# Patient Record
Sex: FEMALE | Race: WHITE | NOT HISPANIC OR LATINO | Employment: OTHER | URBAN - METROPOLITAN AREA
[De-identification: names, ages, dates, MRNs, and addresses within clinical notes are randomized per-mention and may not be internally consistent; named-entity substitution may affect disease eponyms.]

---

## 2024-05-08 ENCOUNTER — TRANSCRIBE ORDERS (OUTPATIENT)
Dept: PHYSICAL THERAPY | Facility: HOSPITAL | Age: 79
End: 2024-05-08
Payer: MEDICARE

## 2024-05-08 DIAGNOSIS — I89.0 LYMPHEDEMA: Primary | ICD-10-CM

## 2024-05-16 ENCOUNTER — HOSPITAL ENCOUNTER (OUTPATIENT)
Dept: OCCUPATIONAL THERAPY | Facility: HOSPITAL | Age: 79
Setting detail: THERAPIES SERIES
Discharge: HOME | End: 2024-05-16
Payer: MEDICARE

## 2024-05-16 DIAGNOSIS — I89.0 LYMPHEDEMA OF BOTH LOWER EXTREMITIES: Primary | ICD-10-CM

## 2024-05-16 PROCEDURE — 97166 OT EVAL MOD COMPLEX 45 MIN: CPT

## 2024-05-16 PROCEDURE — 97535 SELF CARE MNGMENT TRAINING: CPT

## 2024-05-16 NOTE — THERAPY EVALUATION
Outpatient Occupational Therapy Lymphedema Initial Evaluation  [facility]     Patient Name: Constantine Puentes  : 1945  MRN: [id number]  Today's Date: 2024      Visit Date: 2024    There is no problem list on file for this patient.       Past Medical History:   Diagnosis Date    CKD (chronic kidney disease)         History reviewed. No pertinent surgical history.      Visit Dx:     ICD-10-CM ICD-9-CM   1. Lymphedema of both lower extremities  I89.0 457.1        Patient History       Row Name 24 1200             History    Chief Complaint Swelling  -RE      Date Current Problem(s) Began 24  Referral date  -RE      Brief Description of Current Complaint Patient presents for evaluation of bilateral lower extremity swelling.  Patient reports that her swelling has been ongoing for at least 5 years.  She has difficulty with compression stockings as they tend to cause skin irritation and itching.  She was referred today because she has a long trip planned where she will be driving and flying extensively.  Patient is very concerned that her lymphedema will significantly increase with travel.  -RE      Patient/Caregiver Goals Know what to do to help the symptoms;Decrease swelling  -RE      How has patient tried to help current problem? elevation  -RE      Are you or can you be pregnant No  -RE         Pain     Pain at Present 0  -RE         Fall Risk Assessment    Number of falls reported in the last 12 months 0  -RE         Services    Are you currently receiving Home Health services No  -RE         Daily Activities    Primary Language English  -RE      Are you able to read Yes  -RE      Are you able to write Yes  -RE      How does patient learn best? Listening;Reading;Demonstration  -RE      Teaching needs identified Home Exercise Program;Management of Condition  -RE      Patient is concerned about/has problems with Other (comment)  Skin irritation and increased swelling during travel  -RE   "    Does patient have problems with the following? None  -RE      Barriers to learning None  -RE      Explanation of Functional Status Problem Patient is independent with ADLs  -RE      Pt Participated in POC and Goals Yes  -RE         Safety    Are you being hurt, hit, or frightened by anyone at home or in your life? No  -RE      Are you being neglected by a caregiver No  -RE                User Key  (r) = Recorded By, (t) = Taken By, (c) = Cosigned By      Initials Name Provider Type    RE Ghada Alicea OTR Occupational Therapist                     Lymphedema       Row Name 05/16/24 1300             Subjective Pain    Able to rate subjective pain? yes  -RE      Pre-Treatment Pain Level 0  -RE      Post-Treatment Pain Level 0  -RE      Subjective Pain Comment Patient has increased pain and discomfort at the end of the day  -RE         Subjective    Subjective Comments Patient reports that she is leaving town for an extended vacation on the 22nd of this month.  She is wondering if there is anything that she can do to try to control her lymphedema until she can start treatment.  -RE         Lymphedema Assessment    Lymphedema Classification RLE:;LLE:;secondary;stage 2 (Spontaneously Irreversible)  -RE         LLIS - Physical Concerns    The amount of pain associated with my lymphedema is: 2  -RE      The amount of limb heaviness associated with my lymphedema is: 2  -RE      The amount of skin tightness associated with my lymphedema is: 3  -RE      The size of my swollen limb(s) seems: 2  -RE      Lymphedema affects the movement of my swollen limb(s): 2  -RE      The strength in my swollen limb(s) is: 2  -RE         LLIS - Psychosocial Concerns    Lymphedema affects my body image (i.e., \"how I think I look\"). 3  -RE      Lymphedema affects my socializing with others. 2  -RE      Lymphedema affects my intimate relations with spouse or partner (rate 0 if not applicable 0  -RE      Lymphedema \"gets me down\" (i.e., " depression, frustration, or anger) 2  -RE      I must rely on others for help due to my lymphedema. 0  -RE      I know what to do to manage my lymphedema 1  -RE         LLIS - Functional Concerns    Lymphedema affects my ability to perform self-care activities (i.e. eating, dressing, hygiene) 0  -RE      Lymphedema affects my ability to perform routine home or work-related activities. 0  -RE      Lymphedema affects my performance of preferred leisure activities. 1  -RE      Lymphedema affects proper fit of clothing/shoes 2  -RE      Lymphedema affects my sleep 1  -RE         General ROM    GENERAL ROM COMMENTS Lower extremity active range of motion is within functional limits  -RE         MMT (Manual Muscle Testing)    General MMT Comments Bilateral lower extremity strength is greater than or equal to 4/5  -RE         Lymphedema Edema Assessment    Ptting Edema Category By grade out of 4  -RE      Pitting Edema + 3/4;Moderate  -RE      Stemmer Sign bilateral:;positive  -RE      Juana Diaz Hump bilateral:;positive  -RE         Lymphedema Sensation    Lymphedema Sensation Reports RLE:;LLE:  -RE      Lymphedema Sensation Tests light touch  -RE      Lymphedema Light Touch RLE:;LLE:;WNL  -RE         Lymphedema Measurements    Measurement Type(s) Circumferential  -RE      Circumferential Areas Lower extremities  -RE         BLE Circumferential (cm)    Measurement Location 1 Knee  -RE      Left 1 38.5 cm  -RE      Right 1 37.5 cm  -RE      Measurement Location 2 10 cm below-knee  -RE      Left 2 41 cm  -RE      Right 2 40.5 cm  -RE      Measurement Location 3 20 cm below-knee  -RE      Left 3 39 cm  -RE      Right 3 36.8 cm  -RE      Measurement Location 4 30 cm below-knee  -RE      Left 4 31 cm  -RE      Right 4 30.5 cm  -RE      Measurement Location 5 Ankle  -RE      Left 5 29.4 cm  -RE      Right 5 29.3 cm  -RE      Measurement Location 6 Midfoot  -RE      Left 6 24.5 cm  -RE      Right 6 24.6 cm  -RE      LLE  "Circumferential Total 203.4 cm  -RE      RLE Circumferential Total 199.2 cm  -RE         Compression/Skin Care    Compression/Skin Care Comments Measured for Ledazo dynamic cotton mens sock model 3521 size 4 in 20 to 30 mmHg.  -RE         Lymphedema Life Impact Scale Totals    A.  Total Q1 - Q17 (Do not include Q18) 25  -RE      B.  Total number of questions answered (Q1-Q17) 17  -RE      C. Divide A by B 1.47  -RE      D. Multiple C by 25 36.75  -RE                User Key  (r) = Recorded By, (t) = Taken By, (c) = Cosigned By      Initials Name Provider Type    RE Ghada Alicea, OTR Occupational Therapist                            Therapy Education  Education Details: Discussed lymphedema treatment including estimated duration. Gave patient \"Healthy Habits for Lymphedema Patients\" and \"What is Lymphedema\" and reviewed with patient. Discussed disease process and what to expect from therapy.  Given: Edema management, Symptoms/condition management, Bandaging/dressing change  Program: New  How Provided: Verbal, Written  Provided to: Patient  Level of Understanding: Teach back education performed, Verbalized  62296 - OT Self Care/Mgmt Minutes: 30         OT Goals       Row Name 05/16/24 1800          OT Short Term Goals    STG Date to Achieve 06/16/24  -RE     STG 1 Patient will demonstrate proper awareness of “What is Lymphedema?” and \"Healthy Habits\" for improved prevention, management, care of symptoms and ease of transition to self-care of condition.  -RE     STG 1 Progress New  -RE     STG 2 Patient independent and compliant with self-wrapping techniques of compression bandages and/or velcro products with assistance of caregiver as needed for improved self-management of condition.  -RE     STG 2 Progress New  -RE     STG 3 Patient will demonstrate decreased net edema of bilateral lower extremities >/= 7-15cm  for decrease in edema, symptoms, decreased risk of infection and improved skin care/transition to self-care " of condition.  -RE     STG 3 Progress New  -RE        Long Term Goals    LTG Date to Achieve 08/16/24  -RE     LTG 1 Patient will demonstrate decreased net edema of bilateral lower extremities >/= 15-25 cm  for decrease in edema, symptoms, decreased risk of infection and improved skin care/transition to self-care of condition.  -RE     LTG 1 Progress New  -RE     LTG 2 Patient independent and compliant with initial home exercise program focused on diaphragmatic breathing, range of motion, flexibility to decrease edema and improve lymphatic flow for decreased edema and decreased risk of infection.  -RE     LTG 2 Progress New  -RE     LTG 3 Patient to improve LLIS by 10 points by discharge.  -RE     LTG 3 Progress New  -RE     LTG 4 Patient independent and compliant with use and care of compression garments and/or Velcro products with assistance of a caregiver as needed to promote self-care independence.  -RE     LTG 4 Progress New  -RE        Time Calculation    OT Goal Re-Cert Due Date 08/16/24  -RE               User Key  (r) = Recorded By, (t) = Taken By, (c) = Cosigned By      Initials Name Provider Type    Ghada Sorenson OTR Occupational Therapist                     OT Assessment/Plan       Row Name 05/16/24 1815          OT Assessment    Functional Limitations Limitation in home management  -RE     Impairments Edema;Impaired lymphatic circulation;Impaired venous circulation  -RE     Assessment Comments Patient is a 78-year-old female who presents with signs and symptoms consistent with bilateral lower extremity lymphedema which extends from toes to knee.  Edema is firm with 3+ pitting.  The total circumference of the right lower extremity is 203.4 cm and the left lower extremity is 199.2 cm.   She could benefit from Complete Decongestive Therapy to decrease edema, protect and improve skin integrity, decrease the risk of infection, and to learn self-care strategies.  Patient will be leaving for a preplanned  extensive trip in less than a week.  Her doctor was concerned about worsening edema during travel.  I recommended the Juzo mends cotton compression stockings and 20 to 30 mmHg to try to prevent further symptoms while she travels.  Patient's current compression stockings cause itching and the skin rash.  I recommended the cotton stocking because it has a cotton lining which hopefully will prevent the skin irritation that she is currently experiencing.  When patient returns from her trip she will begin complete decongestive therapy with Velcro compression products which will include (2) lower leg CircAid reduction kits in the standard length and a regular width, (2) large toe caps, and (2) CircAid customizable interlocking ankle-foot wraps.  Patient will require these products for compression during and after treatment.  They will allow increased independence.  -RE     OT Diagnosis Bilateral lower extremity lymphedema  -RE     OT Rehab Potential Good  -RE     Patient/caregiver participated in establishment of treatment plan and goals Yes  -RE     Patient would benefit from skilled therapy intervention Yes  -RE        OT Plan    OT Frequency 4x/week  -RE     Predicted Duration of Therapy Intervention (OT) 4 to 6 weeks  -RE     Planned CPT's? OT EVAL MOD COMPLEXITY: 63441;OT SELF CARE/MGMT/TRAIN 15 MIN: 98533;OT MANUAL THERAPY EA 15 MIN: 17482  -RE     Planned Therapy Interventions (Optional Details) home exercise program;patient/family education;manual therapy techniques;other (see comments)  -RE     OT Plan Comments I will order patient's compression products and have them shipped to [place].  This is where she will be staying for the next few weeks.  -RE               User Key  (r) = Recorded By, (t) = Taken By, (c) = Cosigned By      Initials Name Provider Type    Ghada Sorenson OTR Occupational Therapist                              Time Calculation:   OT Start Time: 0900  OT Stop Time: 1000  OT Time Calculation  (min): 60 min  Total Timed Code Minutes- OT: 30 minute(s)  Timed Charges  06327 - OT Self Care/Mgmt Minutes: 30  Untimed Charges  OT Eval/Re-eval Minutes: 30  Total Minutes  Timed Charges Total Minutes: 30  Untimed Charges Total Minutes: 30   Total Minutes: 60     Therapy Charges for Today       Code Description Service Date Service Provider Modifiers Qty    91287668873 HC OT SELF CARE/MGMT/TRAIN EA 15 MIN 5/16/2024 Ghada Alicea OTR GO 2    34600092121 HC OT EVAL MOD COMPLEXITY 2 5/16/2024 Ghada Alicea OTR GO 1          Dictated utilizing Dragon dictation:  Much of this encounter note is an electronic transcription/translation of spoken language to printed text. The electronic translation of spoken language may permit erroneous, or at times, nonsensical words or phrases to be inadvertently transcribed; Although I have reviewed the note for such errors, some may still exist.            ERUM Salinas  5/16/2024

## 2024-10-21 ENCOUNTER — HOSPITAL ENCOUNTER (OUTPATIENT)
Dept: OCCUPATIONAL THERAPY | Facility: HOSPITAL | Age: 79
Setting detail: THERAPIES SERIES
Discharge: HOME | End: 2024-10-21
Payer: MEDICARE

## 2024-10-21 DIAGNOSIS — I89.0 LYMPHEDEMA OF BOTH LOWER EXTREMITIES: Primary | ICD-10-CM

## 2024-10-21 PROCEDURE — 97535 SELF CARE MNGMENT TRAINING: CPT

## 2024-10-21 NOTE — THERAPY PROGRESS REPORT/RE-CERT
Outpatient Occupational Therapy Lymphedema Progress Note  [facility]     Patient Name: Constantine Puentes  : 1945  MRN: [id number]  Today's Date: 10/21/2024      Visit Date: 10/21/2024    There is no problem list on file for this patient.       Past Medical History:   Diagnosis Date    CKD (chronic kidney disease)         No past surgical history on file.      Visit Dx:      ICD-10-CM ICD-9-CM   1. Lymphedema of both lower extremities  I89.0 457.1        Lymphedema       Row Name 10/21/24 1400             Subjective Pain    Able to rate subjective pain? yes  -RE      Pre-Treatment Pain Level 0  -RE      Post-Treatment Pain Level 0  -RE         Subjective    Subjective Comments Patient reports that she has been wearing over-the-counter compression stockings.  She states that since the weather has been cooler her swelling has improved significantly.  However, she traveled to [place] approximately 2 weeks ago and noticed a significant increase due to the heat.  -RE         BLE Circumferential (cm)    Measurement Location 1 Knee  -RE      Left 1 36.5 cm  -RE      Right 1 35 cm  -RE      Measurement Location 2 10 cm below-knee  -RE      Left 2 38 cm  -RE      Right 2 37.5 cm  -RE      Measurement Location 3 20 cm below-knee  -RE      Left 3 32.3 cm  -RE      Right 3 31 cm  -RE      Measurement Location 4 30 cm below-knee  -RE      Left 4 25.5 cm  -RE      Right 4 24.8 cm  -RE      Measurement Location 5 Ankle  -RE      Left 5 25.7 cm  -RE      Right 5 25 cm  -RE      Measurement Location 6 Midfoot  -RE      Left 6 22 cm  -RE      Right 6 21 cm  -RE      LLE Circumferential Total 180 cm  -RE      RLE Circumferential Total 174.3 cm  -RE                User Key  (r) = Recorded By, (t) = Taken By, (c) = Cosigned By      Initials Name Provider Type    RE Ghada Alicea OTR Occupational Therapist                             OT Assessment/Plan       Row Name 10/21/24 0625          OT Assessment    Impairments  Edema;Impaired lymphatic circulation;Impaired venous circulation  -RE     Assessment Comments Patient returns today for reassessment of bilateral lower extremity swelling.  There is been some delay in initiating treatment following patient's evaluation due to patient's travel plans as well as a recent fall which required approximately a 2-month recovery.  Patient reports that her edema has significantly improved with the onset of cooler weather.  She is currently wearing over-the-counter compression stockings.  The total circumference of her right lower extremity has decreased by 24.9 cm and her left lower extremity has decreased by 23.4 cm that is meeting both of her long-term reduction goals.  Therefore, I recommended she continue with her current reduction products.  Since her edema seems to increase dramatically as the weather warms I recommended she return in March to be fitted with her reduction kits and to start complete decongestive therapy.  Patient should return earlier if she has any significant increase in symptoms.  -RE     OT Diagnosis Bilateral lower extremity lymphedema  -RE     OT Rehab Potential Good  -RE     Patient/caregiver participated in establishment of treatment plan and goals Yes  -RE     Patient would benefit from skilled therapy intervention Yes  -RE        OT Plan    OT Frequency 3x/week;4x/week  -RE     Predicted Duration of Therapy Intervention (OT) Follow-up in March to begin complete decongestive therapy 3-4 times a week x 4 weeks  -RE     Planned CPT's? OT SELF CARE/MGMT/TRAIN 15 MIN: 66390;OT THER PROC EA 15 MIN: 85860LX;OT MANUAL THERAPY EA 15 MIN: 84515  -RE     OT Plan Comments Follow-up in March  -RE               User Key  (r) = Recorded By, (t) = Taken By, (c) = Cosigned By      Initials Name Provider Type    RE Ghada Alicea OTR Occupational Therapist                           OT Goals       Row Name 10/21/24 1400          OT Short Term Goals    STG Date to Achieve 11/21/24  " -RE     STG 1 Patient will demonstrate proper awareness of “What is Lymphedema?” and \"Healthy Habits\" for improved prevention, management, care of symptoms and ease of transition to self-care of condition.  -RE     STG 1 Progress Ongoing  -RE     STG 2 Patient independent and compliant with self-wrapping techniques of compression bandages and/or velcro products with assistance of caregiver as needed for improved self-management of condition.  -RE     STG 2 Progress Ongoing  -RE     STG 3 Patient will demonstrate decreased net edema of bilateral lower extremities >/= 7-15cm  for decrease in edema, symptoms, decreased risk of infection and improved skin care/transition to self-care of condition.  -RE     STG 3 Progress Met  -RE        Long Term Goals    LTG Date to Achieve 01/21/25  -RE     LTG 1 Patient will demonstrate decreased net edema of bilateral lower extremities >/= 15-25 cm  for decrease in edema, symptoms, decreased risk of infection and improved skin care/transition to self-care of condition.  -RE     LTG 1 Progress Met  -RE     LTG 2 Patient independent and compliant with initial home exercise program focused on diaphragmatic breathing, range of motion, flexibility to decrease edema and improve lymphatic flow for decreased edema and decreased risk of infection.  -RE     LTG 2 Progress Ongoing  -RE     LTG 3 Patient to improve LLIS by 10 points by discharge.  -RE     LTG 3 Progress Ongoing  -RE     LTG 4 Patient independent and compliant with use and care of compression garments and/or Velcro products with assistance of a caregiver as needed to promote self-care independence.  -RE     LTG 4 Progress Ongoing  -RE        Time Calculation    OT Goal Re-Cert Due Date 01/21/25  -RE               User Key  (r) = Recorded By, (t) = Taken By, (c) = Cosigned By      Initials Name Provider Type    Ghada Sorenson OTR Occupational Therapist                    Therapy Education  Education Details: We discussed " patient's current at home compression program.  She is wearing over-the-counter compression stockings.  I recommended that she purchase 1-2 more pairs so compression will be optimal.  We discussed treating with manual lymph drainage and her reduction kits this spring.  Following treatment we would measure and fit for medical grade knee-high compression stockings for day use and reduction kits for a juxta fit for night use.  Given: Symptoms/condition management, Edema management  Program: New, Reinforced  How Provided: Verbal  Provided to: Patient  Level of Understanding: Teach back education performed, Verbalized  65562 - OT Self Care/Mgmt Minutes: 30                Time Calculation:   OT Start Time: 1440  OT Stop Time: 1510  OT Time Calculation (min): 30 min  Total Timed Code Minutes- OT: 30 minute(s)  Timed Charges  54269 - OT Self Care/Mgmt Minutes: 30  Total Minutes  Timed Charges Total Minutes: 30   Total Minutes: 30     Therapy Charges for Today       Code Description Service Date Service Provider Modifiers Qty    94885932248 HC OT SELF CARE/MGMT/TRAIN EA 15 MIN 10/21/2024 Ghada Alicea, OTR GO 2          Dictated utilizing Dragon dictation:  Much of this encounter note is an electronic transcription/translation of spoken language to printed text. The electronic translation of spoken language may permit erroneous, or at times, nonsensical words or phrases to be inadvertently transcribed; Although I have reviewed the note for such errors, some may still exist.              ERUM Salinas  10/21/2024